# Patient Record
Sex: MALE | Race: WHITE | Employment: UNEMPLOYED | URBAN - NONMETROPOLITAN AREA
[De-identification: names, ages, dates, MRNs, and addresses within clinical notes are randomized per-mention and may not be internally consistent; named-entity substitution may affect disease eponyms.]

---

## 2020-08-19 ENCOUNTER — HOSPITAL ENCOUNTER (INPATIENT)
Age: 30
LOS: 5 days | Discharge: HOME | DRG: 751 | End: 2020-08-24
Attending: EMERGENCY MEDICINE | Admitting: PSYCHIATRY & NEUROLOGY
Payer: COMMERCIAL

## 2020-08-19 LAB
ACETAMINOPHEN LEVEL: <15 UG/ML
ALBUMIN SERPL-MCNC: 4.7 G/DL (ref 3.5–5.2)
ALP BLD-CCNC: 56 U/L (ref 40–130)
ALT SERPL-CCNC: 80 U/L (ref 5–41)
AMPHETAMINE SCREEN, URINE: NEGATIVE
ANION GAP SERPL CALCULATED.3IONS-SCNC: 13 MMOL/L (ref 7–19)
AST SERPL-CCNC: 40 U/L (ref 5–40)
BARBITURATE SCREEN URINE: NEGATIVE
BASOPHILS ABSOLUTE: 0 K/UL (ref 0–0.2)
BASOPHILS RELATIVE PERCENT: 0.3 % (ref 0–1)
BENZODIAZEPINE SCREEN, URINE: NEGATIVE
BILIRUB SERPL-MCNC: <0.2 MG/DL (ref 0.2–1.2)
BILIRUBIN URINE: NEGATIVE
BLOOD, URINE: NEGATIVE
BUN BLDV-MCNC: 16 MG/DL (ref 6–20)
CALCIUM SERPL-MCNC: 10 MG/DL (ref 8.6–10)
CANNABINOID SCREEN URINE: NEGATIVE
CHLORIDE BLD-SCNC: 100 MMOL/L (ref 98–111)
CLARITY: CLEAR
CO2: 27 MMOL/L (ref 22–29)
COCAINE METABOLITE SCREEN URINE: NEGATIVE
COLOR: YELLOW
CREAT SERPL-MCNC: 1 MG/DL (ref 0.5–1.2)
EOSINOPHILS ABSOLUTE: 0.1 K/UL (ref 0–0.6)
EOSINOPHILS RELATIVE PERCENT: 1.5 % (ref 0–5)
ETHANOL: <10 MG/DL (ref 0–0.08)
GFR AFRICAN AMERICAN: >59
GFR NON-AFRICAN AMERICAN: >60
GLUCOSE BLD-MCNC: 93 MG/DL (ref 74–109)
GLUCOSE URINE: NEGATIVE MG/DL
HCT VFR BLD CALC: 44.5 % (ref 42–52)
HEMOGLOBIN: 15.3 G/DL (ref 14–18)
IMMATURE GRANULOCYTES #: 0.1 K/UL
KETONES, URINE: NEGATIVE MG/DL
LEUKOCYTE ESTERASE, URINE: NEGATIVE
LYMPHOCYTES ABSOLUTE: 2.6 K/UL (ref 1.1–4.5)
LYMPHOCYTES RELATIVE PERCENT: 32.6 % (ref 20–40)
Lab: NORMAL
MCH RBC QN AUTO: 30.8 PG (ref 27–31)
MCHC RBC AUTO-ENTMCNC: 34.4 G/DL (ref 33–37)
MCV RBC AUTO: 89.7 FL (ref 80–94)
MONOCYTES ABSOLUTE: 0.6 K/UL (ref 0–0.9)
MONOCYTES RELATIVE PERCENT: 7.3 % (ref 0–10)
NEUTROPHILS ABSOLUTE: 4.6 K/UL (ref 1.5–7.5)
NEUTROPHILS RELATIVE PERCENT: 57.7 % (ref 50–65)
NITRITE, URINE: NEGATIVE
OPIATE SCREEN URINE: NEGATIVE
PDW BLD-RTO: 12.8 % (ref 11.5–14.5)
PH UA: 6 (ref 5–8)
PLATELET # BLD: 234 K/UL (ref 130–400)
PMV BLD AUTO: 10.2 FL (ref 9.4–12.4)
POTASSIUM REFLEX MAGNESIUM: 4.7 MMOL/L (ref 3.5–5)
PROTEIN UA: ABNORMAL MG/DL
RBC # BLD: 4.96 M/UL (ref 4.7–6.1)
SALICYLATE, SERUM: <3 MG/DL (ref 3–10)
SODIUM BLD-SCNC: 140 MMOL/L (ref 136–145)
SPECIFIC GRAVITY UA: 1.02 (ref 1–1.03)
TOTAL PROTEIN: 7.3 G/DL (ref 6.6–8.7)
UROBILINOGEN, URINE: 0.2 E.U./DL
WBC # BLD: 7.9 K/UL (ref 4.8–10.8)

## 2020-08-19 PROCEDURE — 99283 EMERGENCY DEPT VISIT LOW MDM: CPT

## 2020-08-19 PROCEDURE — 6370000000 HC RX 637 (ALT 250 FOR IP): Performed by: PSYCHIATRY & NEUROLOGY

## 2020-08-19 PROCEDURE — 80053 COMPREHEN METABOLIC PANEL: CPT

## 2020-08-19 PROCEDURE — 85025 COMPLETE CBC W/AUTO DIFF WBC: CPT

## 2020-08-19 PROCEDURE — 99284 EMERGENCY DEPT VISIT MOD MDM: CPT

## 2020-08-19 PROCEDURE — 80307 DRUG TEST PRSMV CHEM ANLYZR: CPT

## 2020-08-19 PROCEDURE — 81003 URINALYSIS AUTO W/O SCOPE: CPT

## 2020-08-19 PROCEDURE — G0480 DRUG TEST DEF 1-7 CLASSES: HCPCS

## 2020-08-19 PROCEDURE — 36415 COLL VENOUS BLD VENIPUNCTURE: CPT

## 2020-08-19 PROCEDURE — 1240000000 HC EMOTIONAL WELLNESS R&B

## 2020-08-19 RX ORDER — ARIPIPRAZOLE 10 MG/1
5 TABLET ORAL DAILY
COMMUNITY

## 2020-08-19 RX ORDER — ALBUTEROL SULFATE 90 UG/1
2 AEROSOL, METERED RESPIRATORY (INHALATION) EVERY 6 HOURS PRN
COMMUNITY

## 2020-08-19 RX ORDER — POLYETHYLENE GLYCOL 3350 17 G/17G
17 POWDER, FOR SOLUTION ORAL DAILY PRN
Status: DISCONTINUED | OUTPATIENT
Start: 2020-08-19 | End: 2020-08-24 | Stop reason: HOSPADM

## 2020-08-19 RX ORDER — ACETAMINOPHEN 325 MG/1
650 TABLET ORAL EVERY 4 HOURS PRN
Status: DISCONTINUED | OUTPATIENT
Start: 2020-08-19 | End: 2020-08-24 | Stop reason: HOSPADM

## 2020-08-19 RX ORDER — TRAZODONE HYDROCHLORIDE 50 MG/1
50 TABLET ORAL ONCE
Status: DISCONTINUED | OUTPATIENT
Start: 2020-08-19 | End: 2020-08-20

## 2020-08-19 RX ORDER — VENLAFAXINE 50 MG/1
50 TABLET ORAL 3 TIMES DAILY
COMMUNITY

## 2020-08-19 RX ORDER — TRAZODONE HYDROCHLORIDE 100 MG/1
100 TABLET ORAL NIGHTLY
COMMUNITY

## 2020-08-19 RX ORDER — HYDROXYZINE HYDROCHLORIDE 25 MG/1
25 TABLET, FILM COATED ORAL 3 TIMES DAILY PRN
Status: DISCONTINUED | OUTPATIENT
Start: 2020-08-19 | End: 2020-08-24 | Stop reason: HOSPADM

## 2020-08-19 RX ORDER — NICOTINE 21 MG/24HR
1 PATCH, TRANSDERMAL 24 HOURS TRANSDERMAL DAILY
Status: DISCONTINUED | OUTPATIENT
Start: 2020-08-19 | End: 2020-08-24 | Stop reason: HOSPADM

## 2020-08-19 RX ADMIN — HYDROXYZINE HYDROCHLORIDE 25 MG: 25 TABLET, FILM COATED ORAL at 17:49

## 2020-08-19 SDOH — HEALTH STABILITY: MENTAL HEALTH: HOW OFTEN DO YOU HAVE A DRINK CONTAINING ALCOHOL?: NEVER

## 2020-08-19 ASSESSMENT — SLEEP AND FATIGUE QUESTIONNAIRES
DO YOU USE A SLEEP AID: YES
RESTFUL SLEEP: YES
DIFFICULTY FALLING ASLEEP: NO
DIFFICULTY ARISING: NO
DO YOU HAVE DIFFICULTY SLEEPING: YES
AVERAGE NUMBER OF SLEEP HOURS: 8
SLEEP PATTERN: DISTURBED/INTERRUPTED SLEEP
DIFFICULTY STAYING ASLEEP: YES

## 2020-08-19 ASSESSMENT — LIFESTYLE VARIABLES: HISTORY_ALCOHOL_USE: NO

## 2020-08-19 ASSESSMENT — PATIENT HEALTH QUESTIONNAIRE - PHQ9: SUM OF ALL RESPONSES TO PHQ QUESTIONS 1-9: 9

## 2020-08-19 NOTE — PROGRESS NOTES
[facility] Admission From ED  Nursing Admission Note              There is no problem list on file for this patient. Pt admitted [facility] Dr. Verónica clayton [facility] [address] [facility] [address] [facility]11[facility]611-01. Arrived on unit via [facility] with security/nurse escort. Pt appropriately attired in paper scrubs. Body assessment completed by Celia with no contraband discovered. All tubes, lines, and drains were appropriately discontinued by ED staff prior to pt transfer to [facility]. Pt belongings and valuables inventoried and cataloged, stored per policy. Pt oriented to surroundings, program expectations, and copy pt rights given. Received admit packet: [address], Visitation Info, Fall Prevention, Restraints Info. Consents reviewed, signed Pt Rights, Handbook Acceptance, Visit/Call Acceptance, PHI Release, Social Info Release, and Treatment Agreement. Pt verbalizes understanding. Yes. Pt is a smoker? yes Pt offered Nicotine patch yes    Identifies stressors. Suicidal thought.          C/o:    Notes:

## 2020-08-19 NOTE — ED PROVIDER NOTES
[facility] 6 ADULT [facility]  eMERGENCY dEPARTMENT eNCOUnter      Pt Name: Gui Murry  MRN: [id number]  [place] 1990  Date of evaluation: 8/19/2020  Provider: Mayra Kohler MD    CHIEF COMPLAINT       Chief Complaint   Patient presents with   [address] Problem     presents with c/o si, states wants to step in front of train         HISTORY OF PRESENT ILLNESS   (Location/Symptom, Timing/Onset,Context/Setting, Quality, Duration, Modifying Factors, Severity)  Note limiting factors. Gui Murry is a 30 y.o. male who presents to the emergency department      The history is provided by the patient. Mental Health Problem   Presenting symptoms: depression and suicidal thoughts (\"walk in front of train\")    Patient accompanied by: no one. Degree of incapacity (severity):  Severe  Onset quality:  Unable to specify (d/c'd from psych facility in [facility] this am)  Timing:  Constant  Chronicity:  Recurrent  Context: recent medication change    Context: not alcohol use, not drug abuse and not noncompliant    Treatment compliance: All of the time  Relieved by:  Nothing  Worsened by:  Nothing  Ineffective treatments:  Antidepressants and mood stabilizers  Associated symptoms comment:  HOMELESS  Risk factors: hx of mental illness (bipolar) and hx of suicide attempts        NursingNotes were reviewed. REVIEW OF SYSTEMS    (2-9 systems for level 4, 10 or more for level 5)     Review of Systems   Psychiatric/Behavioral: Positive for suicidal ideas (\"walk in front of train\"). All other systems reviewed and are negative. Except as noted above the remainder of the review of systems was reviewed and negative. PAST MEDICAL HISTORY   History reviewed. No pertinent past medical history. SURGICALHISTORY     History reviewed. No pertinent surgical history.       CURRENT MEDICATIONS       Current Discharge Medication List      CONTINUE these medications which have NOT CHANGED    Details   ARIPiprazole (ABILIFY) 10 MG tablet Take 10 mg by mouth daily      traZODone (DESYREL) 100 MG tablet Take 100 mg by mouth nightly      venlafaxine (EFFEXOR) 50 MG tablet Take 50 mg by mouth 3 times daily      albuterol sulfate  (90 Base) MCG/ACT inhaler Inhale 2 puffs into the lungs every 6 hours as needed for Wheezing             ALLERGIES     Patient has no known allergies. FAMILY HISTORY     History reviewed. No pertinent family history.        SOCIAL HISTORY       Social History     Socioeconomic History    Marital status: Single     Spouse name: None    Number of children: None    Years of education: None    Highest education level: None   Occupational History    None   Social Needs    Financial resource strain: None    Food insecurity     Worry: None     Inability: None    Transportation needs     Medical: None     Non-medical: None   Tobacco Use    Smoking status: Current Every Day Smoker    Smokeless tobacco: Never Used   Substance and Sexual Activity    Alcohol use: Never     Frequency: Never    Drug use: Yes     Types: Marijuana    Sexual activity: None   Lifestyle    Physical activity     Days per week: None     Minutes per session: None    Stress: None   Relationships    Social connections     Talks on phone: None     Gets together: None     Attends Congregation service: None     Active member of club or organization: None     Attends meetings of clubs or organizations: None     Relationship status: None    Intimate partner violence     Fear of current or ex partner: None     Emotionally abused: None     Physically abused: None     Forced sexual activity: None   Other Topics Concern    None   Social History Narrative    None       SCREENINGS      @FLOW([id number])@      PHYSICAL EXAM    (up to 7 for level 4, 8 or more for level 5)     ED Triage Vitals [08/19/20 1414]   BP Temp Temp src Pulse Resp SpO2 Height Weight   (!) 146/84 98.2 °F (36.8 °C) -- 84 18 97 % 5' 5\" (1.651 m) 285 lb (129.3 kg) Physical Exam  Vitals signs and nursing note reviewed. Constitutional:       General: He is not in acute distress. Appearance: Normal appearance. He is obese. He is not ill-appearing, toxic-appearing or diaphoretic. HENT:      Mouth/Throat:      Mouth: Mucous membranes are moist.      Pharynx: Oropharynx is clear. Eyes:      Conjunctiva/sclera: Conjunctivae normal.   Neck:      Musculoskeletal: Normal range of motion and neck supple. Cardiovascular:      Rate and Rhythm: Normal rate and regular rhythm. Heart sounds: Normal heart sounds. Pulmonary:      Effort: Pulmonary effort is normal.      Breath sounds: Normal breath sounds. Musculoskeletal:      Right lower leg: No edema. Left lower leg: No edema. Skin:     General: Skin is warm and dry. Neurological:      General: No focal deficit present. Mental Status: He is alert and oriented to person, place, and time. Cranial Nerves: No cranial nerve deficit.    Psychiatric:         Mood and Affect: Mood normal.         DIAGNOSTIC RESULTS     EKG: All EKG's are interpreted by the Emergency Department Physician who either signs or Co-signsthis chart in the absence of a cardiologist.        RADIOLOGY:   Mallie Rocher such as CT, Ultrasound and MRI are read by the radiologist. Plain radiographic images are visualized and preliminarily interpreted by the emergency physician with the below findings:        Interpretation per the Radiologist below, if available at the time ofthis note:    No orders to display         ED BEDSIDE ULTRASOUND:   Performed by ED Physician - none    LABS:  Labs Reviewed   COMPREHENSIVE METABOLIC PANEL W/ REFLEX TO MG FOR LOW K - Abnormal; Notable for the following components:       Result Value    ALT 80 (*)     All other components within normal limits   URINE RT REFLEX TO CULTURE - Abnormal; Notable for the following components:    Protein, UA TRACE (*)     All other components within normal limits   CBC WITH AUTO DIFFERENTIAL   ACETAMINOPHEN LEVEL   SALICYLATE LEVEL   ETHANOL   URINE DRUG SCREEN       All other labs were within normal range or not returned as of this dictation. EMERGENCY DEPARTMENT COURSE and DIFFERENTIAL DIAGNOSIS/MDM:   Vitals:    Vitals:    08/19/20 1414 08/19/20 1500 08/19/20 1600 08/19/20 1630   BP: (!) 146/84 (!) 142/84 (!) 144/86 (!) 119/94   Pulse: 84 82 84 100   Resp: 18 17 16 18   Temp: 98.2 °F (36.8 °C)  98.2 °F (36.8 °C) 97.1 °F (36.2 °C)   TempSrc:    Temporal   SpO2: 97% 98% 97% 98%   Weight: 285 lb (129.3 kg)      Height: 5' 5\" (1.651 m)              MDM  Number of Diagnoses or Management Options  Diagnosis management comments: Discussed with Dr. Tara Ko - admit      CRITICAL CARE TIME   Total Critical Care time was 0 minutes, excluding separately reportable procedures. There was a high probability of clinically significant/lifethreatening deterioration in the patient's condition which required my urgent intervention. CONSULTS:  IP CONSULT TO FAMILY MEDICINE    PROCEDURES:  Unless otherwise noted below, none     Procedures    FINAL IMPRESSION      1. Suicidal ideation          DISPOSITION/PLAN   DISPOSITION        PATIENT REFERRED TO:  No follow-up provider specified.     DISCHARGE MEDICATIONS:  Current Discharge Medication List             (Please note that portions of this note were completed with a voice recognition program.  Efforts were made to edit the dictations but occasionally words are mis-transcribed.)    Justin Fan MD (electronically signed)  Attending Emergency Physician          Justin Fan MD  08/19/20 1275

## 2020-08-19 NOTE — ED NOTES
Bed: 16  Expected date:   Expected time:   Means of arrival:   Comments:  [address], [facility]  08/19/20 8944

## 2020-08-20 PROBLEM — F33.2 MAJOR DEPRESSIVE DISORDER, RECURRENT SEVERE WITHOUT PSYCHOTIC FEATURES (HCC): Status: ACTIVE | Noted: 2020-08-20

## 2020-08-20 PROBLEM — J45.909 ASTHMA: Status: ACTIVE | Noted: 2020-08-20

## 2020-08-20 PROBLEM — F32.A DEPRESSION: Status: ACTIVE | Noted: 2020-08-20

## 2020-08-20 PROBLEM — F17.200 TOBACCO USE DISORDER: Status: ACTIVE | Noted: 2020-08-20

## 2020-08-20 PROBLEM — F32.2 SEVERE MAJOR DEPRESSION WITHOUT PSYCHOTIC FEATURES (HCC): Status: ACTIVE | Noted: 2020-08-20

## 2020-08-20 PROBLEM — Z87.898 HISTORY OF ALCOHOL USE DISORDER: Status: ACTIVE | Noted: 2020-08-20

## 2020-08-20 PROCEDURE — 6370000000 HC RX 637 (ALT 250 FOR IP): Performed by: NURSE PRACTITIONER

## 2020-08-20 PROCEDURE — 90792 PSYCH DIAG EVAL W/MED SRVCS: CPT | Performed by: NURSE PRACTITIONER

## 2020-08-20 PROCEDURE — 6370000000 HC RX 637 (ALT 250 FOR IP): Performed by: PSYCHIATRY & NEUROLOGY

## 2020-08-20 PROCEDURE — 1240000000 HC EMOTIONAL WELLNESS R&B

## 2020-08-20 RX ORDER — GABAPENTIN 300 MG/1
300 CAPSULE ORAL 3 TIMES DAILY
Status: DISCONTINUED | OUTPATIENT
Start: 2020-08-20 | End: 2020-08-21

## 2020-08-20 RX ORDER — ARIPIPRAZOLE 10 MG/1
10 TABLET ORAL DAILY
Status: DISCONTINUED | OUTPATIENT
Start: 2020-08-20 | End: 2020-08-20

## 2020-08-20 RX ORDER — VENLAFAXINE HYDROCHLORIDE 37.5 MG/1
37.5 CAPSULE, EXTENDED RELEASE ORAL
Status: DISCONTINUED | OUTPATIENT
Start: 2020-08-21 | End: 2020-08-21

## 2020-08-20 RX ORDER — TRAZODONE HYDROCHLORIDE 100 MG/1
100 TABLET ORAL NIGHTLY
Status: DISCONTINUED | OUTPATIENT
Start: 2020-08-20 | End: 2020-08-20

## 2020-08-20 RX ORDER — LAMOTRIGINE 25 MG/1
25 TABLET ORAL 2 TIMES DAILY
COMMUNITY

## 2020-08-20 RX ORDER — ARIPIPRAZOLE 5 MG/1
5 TABLET ORAL DAILY
Status: ON HOLD | COMMUNITY
End: 2020-08-24 | Stop reason: HOSPADM

## 2020-08-20 RX ORDER — TRAZODONE HYDROCHLORIDE 100 MG/1
100 TABLET ORAL NIGHTLY PRN
Status: DISCONTINUED | OUTPATIENT
Start: 2020-08-20 | End: 2020-08-24 | Stop reason: HOSPADM

## 2020-08-20 RX ORDER — ARIPIPRAZOLE 5 MG/1
5 TABLET ORAL DAILY
Status: DISCONTINUED | OUTPATIENT
Start: 2020-08-21 | End: 2020-08-24 | Stop reason: HOSPADM

## 2020-08-20 RX ORDER — ALBUTEROL SULFATE 2.5 MG/3ML
2.5 SOLUTION RESPIRATORY (INHALATION) EVERY 6 HOURS PRN
Status: DISCONTINUED | OUTPATIENT
Start: 2020-08-20 | End: 2020-08-24 | Stop reason: HOSPADM

## 2020-08-20 RX ORDER — CLONAZEPAM 0.5 MG/1
0.5 TABLET ORAL DAILY PRN
Status: ON HOLD | COMMUNITY
End: 2020-08-24 | Stop reason: HOSPADM

## 2020-08-20 RX ORDER — ALBUTEROL SULFATE 90 UG/1
2 AEROSOL, METERED RESPIRATORY (INHALATION) EVERY 6 HOURS PRN
Status: DISCONTINUED | OUTPATIENT
Start: 2020-08-20 | End: 2020-08-20 | Stop reason: CLARIF

## 2020-08-20 RX ADMIN — GABAPENTIN 300 MG: 300 CAPSULE ORAL at 20:41

## 2020-08-20 RX ADMIN — GABAPENTIN 300 MG: 300 CAPSULE ORAL at 16:51

## 2020-08-20 RX ADMIN — TRAZODONE HYDROCHLORIDE 100 MG: 100 TABLET ORAL at 20:41

## 2020-08-20 RX ADMIN — HYDROXYZINE HYDROCHLORIDE 25 MG: 25 TABLET, FILM COATED ORAL at 10:21

## 2020-08-20 NOTE — PLAN OF CARE
Group Therapy Note    Date: 8/20/2020  Start Time: 1430  End Time:  4369  Number of Participants: 7    Type of Group: Cognitive Skills    Wellness Binder Information  Module Name:  Women's Issues  Session Number:  1    Group Goal for Pt: To raise awareness of the effectiveness of assertive communication    Notes:  Pt did not attend group discussion. Pt was invited/encouraged. Status After Intervention:      Participation Level:     Participation Quality:       Speech:         Thought Process/Content:       Affective Functioning:       Mood:       Level of consciousness:        Response to Learning:       Endings:     Modes of Intervention:       Discipline Responsible:       Signature:  Clifton Cordoba

## 2020-08-20 NOTE — PROGRESS NOTES
Psychosocial and CSSR-S completed on this date. Pt long and short term goals discussed. Pt voiced understanding. Treatment plan sheet signed. Pt verbalized understanding of the treatment plan. Pt participated in goals and objectives of the treatment plan. Pt completed safety plan with , pt received copy of plan, and original was placed into pt's chart. It was identified that pt will require outpatient follow up appointments at local [facility] behavioral health facility such as; [facility] [address] [place] [address]. Pt validated need for appointments. Pt was also provided a handout of contact information for drug and alcohol treatment centers and other community support service such as [facility] and [facility]. Pt somewhat interested in going to a sober living house, [facility] In social work office aware and is looking into services, pt also was given numbers to locations. In the last 6 months has the pt been danger to self: YES  In the last 6 months has the pt been danger to others:  /NO  Legal Guardian/POA: NO      Provided pt with [employer] handout entitled \"Quitting Smoking,\" reviewed handout with pt addressing dangers of smoking, developing coping skills, and providing basic information about quitting.  Patient received all components / Patient refused/declined practical counseling of tobacco practical counseling during the hospital stay

## 2020-08-20 NOTE — PLAN OF CARE
Problem: Discharge Planning:  Goal: Discharged to appropriate level of care  Description: Discharged to appropriate level of care  Outcome: Ongoing     Problem: Health Maintenance - Impaired:  Goal: Ability to perform activities of daily living will improve  Description: Ability to perform activities of daily living will improve  Outcome: Ongoing  Goal: Able to sleep without medication for appropriate length of time  Description: Able to sleep without medication for appropriate length of time  Outcome: Ongoing  Goal: Maintenance of adequate nutrition will improve  Description: Maintenance of adequate nutrition will improve  Outcome: Ongoing     Problem: Mood - Altered:  Goal: Mood stable  Description: Mood stable  Outcome: Ongoing     Problem: Self-Esteem - Low:  Goal: Demonstrates positive self-esteem  Description: Demonstrates positive self-esteem  Outcome: Ongoing     Problem: Cerebrospinal Fluid Leakage - Risk Of:  Goal: Demonstration of organized thought processes  Description: Demonstration of organized thought processes  Outcome: Completed     Problem: Violence - Risk of, Self/Other-Directed:  Goal: Knowledge of developmental care interventions  Description: Absence of violence  Outcome: Ongoing     Problem: Suicide risk  Goal: Provide patient with safe environment  Description: Provide patient with safe environment  Outcome: Ongoing     Problem: Depressive Behavior With or Without Suicide Precautions:  Goal: Able to verbalize acceptance of life and situations over which he or she has no control  Description: Able to verbalize acceptance of life and situations over which he or she has no control  8/20/2020 1328 by Bibiana Mario RN  Outcome: Ongoing  8/20/2020 1213 by Faraz Bennett  Outcome: Ongoing  Note:                                                                     Group Therapy Note    Date: 8/20/2020  Start Time: 1000  End Time:  1100  Number of Participants: 6    Type of Group: Psychoeducation    Wellness Binder Information  Module Name:  Women's Issues  Session Number:  4    Group Goal for Pt: To raise awareness of how thoughts influence feelings    Notes:  Pt demonstrated improved awareness of how thoughts influence feelings by actively participating in group discussion.     Status After Intervention:  Unchanged    Participation Level: Interactive    Participation Quality: Attentive      Speech:  normal      Thought Process/Content: Logical      Affective Functioning: Congruent      Mood: anxious and depressed      Level of consciousness:  Alert and Oriented x4      Response to Learning: Able to verbalize current knowledge/experience, Able to verbalize/acknowledge new learning, and Progressing to goal      Endings: None Reported    Modes of Intervention: Education      Discipline Responsible: Psychoeducational Specialist      Signature:  Chelsi Monzon

## 2020-08-20 NOTE — PROGRESS NOTES
[facility] Adult Unit Daily Assessment  Nursing Progress Note    Room: [facility]611-01   Name: Geoffrey Jansen   Age: 30 y.o. Gender: male   Dx: <principal problem not specified>  Precautions: suicide risk  Inpatient Status: voluntary       SLEEP:    Sleep Quality Good  Sleep Medications: No   PRN Sleep Meds: No       MEDICAL:    Other PRN Meds: No   Med Compliant: Yes  Accu-Chek: No  Oxygen/CPAP/BiPAP: No  CIWA/CINA: No   PAIN Assessment: none  Side Effects from medication: No      PSYCH:    Depression: did not assess, just arrived to the floor   Anxiety: did not assess, just arrived to the floor  SI denies at this current moment   HI denies   AVH:denies      GENERAL:    Appetite: decreased    Social: No   Speech: normal   Appearance: appropriately dressed and healthy looking      Notes: Patient calm and cooperative with staff and peers. Patient came to the desk and asked this nurse for something to help him sleep. Order obtained and when returning to his room, patient was already asleep. Will continue to monitor.          Electronically signed by Urvashi Wylie RN on 8/20/20 at 12:30 AM CDT

## 2020-08-20 NOTE — PROGRESS NOTES
Group Therapy Note    Start Time: 739  End Time:  684  Number of Participants: 8    Type of Group: Community Meeting       Patient's Goal:  \"to concentrate on me\"      Notes:      Participation Level:  Active Listener       Participation Quality: Appropriate      Thought Process/Content: Logical      Affective Functioning: Congruent      Mood: calm      Level of consciousness:  Alert      Modes of Intervention: Support      Discipline Responsible: Behavioral Health Tech II      Signature:  Flip Arreola

## 2020-08-20 NOTE — PROGRESS NOTES
Treatment Team Note:    SW met with [address] team to discuss [address] plans.      Progress/Behavior/Group Attendance: TBD    Target Symptoms/Reason for admission:  Patient admitted to [facility] due to Presents with c/o si  Diagnoses: Severe major depression without psychotic features ([facility] [address]  Suicidal ideation, Tobacco use disorder, History of alcohol use disorder   UDS: Neg  BAL:  Neg      AftercarePlan: [address] lives with: Homeless    Collateral obtained from: mom  On:8/2020    Family Session: LES    Misc:

## 2020-08-20 NOTE — H&P
[address] [place] [address]    Psychiatric Initial Evaluation    Date of Evaluation:  8/20/2020  Session Type:  63057 Psychiatric Diagnostic Interview Exam   Name:  Ofelia Ervin  Age:  30 y.o. Sex:  male  Ethnicity:   Primary Care Physician:  No primary care provider on file. Patient Care Team:  No care team member to display  Chief Complaint: \" I am not wanting to live anymore. \"    History of Present Illness    Historian: patient  Complaint Type: anxiety, depression, loss of interest in favorite activities, sleep disturbance and tobacco use  Course of Symptoms: ongoing  Symptoms Onset: gradual  Onset Approximately: gradual  Precipitating Factors: history of mental illness  Severity: moderate  Risk Factors: history of depression         Patient is a 60-year-old  male who presents with depression and suicidal ideation with a plan to step out in front of a train. He recently left an inpatient psychiatric hospital in [facility] yesterday. He said at that facility for 1 week. He reports that he was trying to get to the homeless shelter ([address]) here in [facility]. Reports when he arrived they told him his urine drug screen was positive for marijuana and he was not able to stay there. He states, \"after they told me that I thought I am done with life I have been in the bottom for so long. \"  He reports he then started having suicidal thoughts to step in front of a train. Urine drug screen negative, blood alcohol level negative. Endorses a history of alcohol use disorder. Reports he has not drank heavily in a year. Medical history includes asthma. Today he continues to endorse suicidal ideation however does contract for safety. He denies homicidal ideation and psychosis. Reports he would like to move back in with his mother however he is not sure if that is an option.   Reports sleep as \"very poor\", appetite has been fine, energy and concentration have been poor. He rates depression as a 7 on a scale of 0-10 and anxiety as an 8. Becomes tearful during the evaluation and states, \"I just want to be stable again. \"  Reports a prior suicide attempt 5 years ago by hanging. His mother was contacted and she reports he is not allowed to live with her. She reports he has been bouncing around to different friends houses, psychiatric facilities and shelters. She also reports he has a history of abusing prescription medication. He was educated on sober living homes and surrounding shelters. Allergies:    Allergies as of 08/19/2020    (No Known Allergies)       Vital Signs:  Last set of tests and vitals:  Vitals:    08/20/20 0806   BP: (!) 131/93   Pulse: 104   Resp: 18   Temp: 96.5 °F (35.8 °C)   SpO2:      Labs Reviewed   COMPREHENSIVE METABOLIC PANEL W/ REFLEX TO MG FOR LOW K - Abnormal; Notable for the following components:       Result Value    ALT 80 (*)     All other components within normal limits   URINE RT REFLEX TO CULTURE - Abnormal; Notable for the following components:    Protein, UA TRACE (*)     All other components within normal limits   CBC WITH AUTO DIFFERENTIAL   ACETAMINOPHEN LEVEL   SALICYLATE LEVEL   ETHANOL   URINE DRUG SCREEN       Current Medications:   Current Facility-Administered Medications   Medication Dose Route Frequency Provider Last Rate Last Dose    acetaminophen (TYLENOL) tablet 650 mg  650 mg Oral Q4H PRN Brea Valdovinos MD        polyethylene glycol (GLYCOLAX) packet 17 g  17 g Oral Daily PRN Brea Valdovinos MD        nicotine (NICODERM CQ) 21 MG/24HR 1 patch  1 patch Transdermal Daily Brea Valdovinos MD   1 patch at 08/20/20 0915    hydrOXYzine (ATARAX) tablet 25 mg  25 mg Oral TID PRN Brea Valdovinos MD   25 mg at 08/20/20 1021    traZODone (DESYREL) tablet 50 mg  50 mg Oral Once Brea Valdovinos MD           Previous Psychiatric/Substance Use History  Social History:   Born/Raised: [place]  Marital Status:Single  Children:No  Educational Level:High School  Trauma History:emotional/verbal FROM MOTHER  Legal History:2 DUIS AGE 17 AND 23  Tobacco use: 1.0 PPD X 10 YEARS  Employment: UNEMPLOYED SINCE MARCH   Experience: DENIES  Hinduism preference: [employer]   Support system: \"MY MOM AND SISTER\"  Access to guns: DENIES  Payee/POA/ GUARDIAN: DENIES      Medical History:  History reviewed. No pertinent past medical history. MURRAY History:   Marijuana  Past alcohol usage. Quit 6 years ago. Previous CD treatment: DENIES    Lifetime Psychiatric Review of Systems          Leah or Hypomania:  no     Panic Attacks:  no     Phobias:  no     Obsessions and Compulsions:  no     Body or Vocal Tics:  no     Hallucinations:  no     Delusions:  no    Previous psychiatric diagnosis- MDD    Previous psychiatric medications- Abilify, Effexor, Lexapro, Wellbutrin, Buspar, Hydroxyzine, Adderall and Focalin, Klonopin, Valium, Ativan    Previous suicide attempts- 5 years ago by hanging    Previous self injurious behavior- denies    Previous outpatient psychiatric services- [facility]    Previous inpatient psychiatric hospitalizations- [place], [place], [address] [place] [address]    Family History: Mother:  depression and anxiety with panic attacks  Father: depression alcohol abuse  Sister: depression  Paternal Grandmother:completed suicide         MENTAL STATUS EXAM:   Level of consciousness:    Appearance:  well-appearing, street clothes, in chair, good grooming and good hygiene  Behavior/Motor:  no abnormalities noted  Attitude toward examiner: within normal limits and awake  Speech:  normal rate and normal volume  Mood:  \" I have been anxious and hopeless. \"  Affect:  flat  Thought processes:  linear and goal directed  Thought content:  Homocidal ideation : denies  Suicidal Ideation:  active  Delusions:  no evidence of delusions  Perceptual Disturbance:  denies any perceptual disturbance  Cognition:  oriented to person, place, and time  Concentration : good  Memory intact for recent and remote  Mini Mental Status deficits in 3 items recall (1/3) and copying design-28/30  Fund of knowledge:  adequate  Abstract thinking: adequate   Insight:  good  Judgment:  good        Review of Systems:  History obtained from the patient  General ROS: positive for  - sleep disturbance  Psychological ROS: positive for - anxiety, depression, sleep disturbances and suicidal ideation  Ophthalmic ROS: negative  ENT ROS: negative  Allergy and Immunology ROS: negative  Hematological and Lymphatic ROS: negative  Endocrine ROS: negative  Breast ROS: negative  Respiratory ROS: positive for - wheezing  Cardiovascular ROS: no chest pain or dyspnea on exertion  Gastrointestinal ROS: no abdominal pain, change in bowel habits, or black or bloody stools  Genito-Urinary ROS: no dysuria, trouble voiding, or hematuria  Musculoskeletal ROS: negative  Neurological ROS: CN II- XII grossly intact   Dermatological ROS: negative      DSM V Diagnoses:    Severe major depression without psychotic features (HCC)  Suicidal ideation  Tobacco use disorder  History of alcohol use disorder         ELOS 3-5 days          Recommendations:  1. Admit to [facility] and monitor on 15 min checks  2. Phoenix Garcia to be reviewed. 3. Collateral information from family with release  4. Medical monitoring by Dr Lino Jean and associates  5. Acclimate to the unit and encourage group attendance   6. Legal Status: Voluntary  7. Precautions: Suicide  8. Diet: Regular  9. Will resume medications that he was recently discharged with; Abilify 5 mg po daily, Effexor 37.5 mg op daily, Trazodone 100 mg po nightly  10. Disposition: social work consulted    6. Nicotine patch 21 mg transdermal daily- smoking cessation medication  12. HGBA1C  13. LIPID PANEL  14. Will initiate Gabapentin 300 mg po TID- benzodiazepine withdrawal    15.  Social work to assist with a shelter      MIRTHA Mayberry

## 2020-08-20 NOTE — GROUP NOTE
Group Therapy Note    Date: 8/20/2020    Group Start Time: 1600  Group End Time: 1700  Group Topic: Healthy Living/Wellness    L [facility] ADULT [facility]    Laila Arrington RN; Bennie Manrique RN                       Patient's Goal: medication information       Status After Intervention:  Improved    Participation Level: Active Listener    Participation Quality: Appropriate      Speech:  normal      Thought Process/Content: Logical  Linear      Affective Functioning: Congruent      Level of consciousness:  Alert and Oriented x4      Response to Learning: Able to verbalize current knowledge/experience      Endings: None Reported    Modes of Intervention: Education      Discipline Responsible: Registered Nurse      Signature:   Laila Arrington RN

## 2020-08-20 NOTE — PROGRESS NOTES
BHI Daily Shift Assessment  Nursing Progress Note    Room: [facility]-01 Name: Eben Martínez Age: 30 y.o. 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 (HCC)  Precautions: suicide risk  CPAP: No Accu-Chek: No  MSE:  Status and Exam  Normal: No  Facial Expression: Worried, Sad, Avoids Gaze  Affect: Blunt, Constricted  Level of Consciousness: Alert  Mood:Normal: No  Mood: Depressed, Anxious, Helpless, Sad  Motor Activity:Normal: No  Motor Activity: Decreased  Interview Behavior: Cooperative  Preception: [place] to Person, Lucyann Emerald to Time, [place] to Place, [place] to Situation  Attention:Normal: No  Attention: Distractible  Thought Processes: Circumstantial  Thought Content:Normal: No  Thought Content: Poverty of Content  Hallucinations: None  Delusions: No  Memory:Normal: Yes  Insight and Judgment: Yes  Insight and Judgment: Poor Judgment, Poor Insight  Present Suicidal Ideation: Yes(contracts for safety)  Present Homicidal Ideation: No  Sleep: Yes, Fair, has restless sleep Hours Slept: 7 Sched Sleep Meds: Yes PRN Sleep Meds: Yes Other PRN Meds: Yes Med Compliant: Yes Appetite: good Percent Meals: 100% Social: Yes ADLs: No Speech: normal Depression: 8 Anxiety: 9    Babatunde Carty RN

## 2020-08-20 NOTE — PROGRESS NOTES
Comprehensive Nutrition Assessment    Type and Reason for Visit:  Initial, Positive Nutrition Screen    Nutrition Recommendations/Plan: continue current POC    Nutrition Assessment:  well nourished, morbidly obese appearing gentleman. Pt is not at risk for nutritional compromise or malnutrition AEB fat and muscle mass and po intake %  Pt states his UBW is about 280 lbs and does not have any dietary problems at this time    Malnutrition Assessment:  Malnutrition Status:  No malnutrition    Context:  Acute Illness     Findings of the 6 clinical characteristics of malnutrition:  Energy Intake:  No significant decrease in energy intake  Weight Loss:  No significant weight loss     Body Fat Loss:  No significant body fat loss     Muscle Mass Loss:  No significant muscle mass loss    Fluid Accumulation:  No significant fluid accumulation     Strength:  Not Performed      Nutrition Related Findings:  well nourished, morbidly obese      Wounds:  None       Current Nutrition Therapies:    DIET GENERAL; Anthropometric Measures:  · Height: 5' 5\" (165.1 cm)  · Current Body Weight: 280 lb (127 kg)   · Admission Body Weight: 285 lb (129.3 kg)(stated)    · Usual Body Weight: 280 lb (127 kg)     · Ideal Body Weight: 136 lbs; BMI: 46.6  · Adjusted Body Weight:  ; No Adjustment   · BMI Categories: Obese Class 3 (BMI 40.0 or greater)       Nutrition Diagnosis:   · Overweight/Obese related to excessive energy intake(medication) as evidenced by BMI      Nutrition Interventions:   Food and/or Nutrient Delivery:  Continue Current Diet  Nutrition Education/Counseling:  No recommendation at this time   Coordination of Nutrition Care:  Continued Inpatient Monitoring    Goals:  po intake 75% or reater.   Weight stable       Nutrition Monitoring and Evaluation:   Behavioral-Environmental Outcomes:      Food/Nutrient Intake Outcomes:  Food and Nutrient Intake  Physical Signs/Symptoms Outcomes:  Biochemical Data, Skin, Weight Discharge Planning:    Continue current diet     Electronically signed by Lanice Gowers, MS, RD, LD on 8/20/20 at 10:55 AM CDT    Contact: [phone]

## 2020-08-21 LAB
TSH REFLEX FT4: 3.55 UIU/ML (ref 0.35–5.5)
VITAMIN B-12: 479 PG/ML (ref 211–946)
VITAMIN D 25-HYDROXY: 14 NG/ML

## 2020-08-21 PROCEDURE — 6360000002 HC RX W HCPCS: Performed by: NURSE PRACTITIONER

## 2020-08-21 PROCEDURE — 1240000000 HC EMOTIONAL WELLNESS R&B

## 2020-08-21 PROCEDURE — 94640 AIRWAY INHALATION TREATMENT: CPT

## 2020-08-21 PROCEDURE — 84443 ASSAY THYROID STIM HORMONE: CPT

## 2020-08-21 PROCEDURE — 6370000000 HC RX 637 (ALT 250 FOR IP): Performed by: NURSE PRACTITIONER

## 2020-08-21 PROCEDURE — 6370000000 HC RX 637 (ALT 250 FOR IP): Performed by: PSYCHIATRY & NEUROLOGY

## 2020-08-21 PROCEDURE — 36415 COLL VENOUS BLD VENIPUNCTURE: CPT

## 2020-08-21 PROCEDURE — 99231 SBSQ HOSP IP/OBS SF/LOW 25: CPT | Performed by: NURSE PRACTITIONER

## 2020-08-21 PROCEDURE — 6370000000 HC RX 637 (ALT 250 FOR IP): Performed by: FAMILY MEDICINE

## 2020-08-21 PROCEDURE — 82607 VITAMIN B-12: CPT

## 2020-08-21 PROCEDURE — 82306 VITAMIN D 25 HYDROXY: CPT

## 2020-08-21 RX ORDER — ERGOCALCIFEROL 1.25 MG/1
50000 CAPSULE ORAL WEEKLY
Status: DISCONTINUED | OUTPATIENT
Start: 2020-08-21 | End: 2020-08-24 | Stop reason: HOSPADM

## 2020-08-21 RX ORDER — GABAPENTIN 100 MG/1
200 CAPSULE ORAL 3 TIMES DAILY
Status: DISCONTINUED | OUTPATIENT
Start: 2020-08-21 | End: 2020-08-24 | Stop reason: HOSPADM

## 2020-08-21 RX ORDER — VENLAFAXINE HYDROCHLORIDE 75 MG/1
75 CAPSULE, EXTENDED RELEASE ORAL
Status: DISCONTINUED | OUTPATIENT
Start: 2020-08-21 | End: 2020-08-23

## 2020-08-21 RX ADMIN — TRAZODONE HYDROCHLORIDE 100 MG: 100 TABLET ORAL at 20:16

## 2020-08-21 RX ADMIN — HYDROXYZINE HYDROCHLORIDE 25 MG: 25 TABLET, FILM COATED ORAL at 20:16

## 2020-08-21 RX ADMIN — GABAPENTIN 200 MG: 100 CAPSULE ORAL at 20:16

## 2020-08-21 RX ADMIN — ACETAMINOPHEN 650 MG: 325 TABLET, FILM COATED ORAL at 15:22

## 2020-08-21 RX ADMIN — ALBUTEROL SULFATE 2.5 MG: 2.5 SOLUTION RESPIRATORY (INHALATION) at 20:51

## 2020-08-21 RX ADMIN — GABAPENTIN 200 MG: 100 CAPSULE ORAL at 13:23

## 2020-08-21 RX ADMIN — ERGOCALCIFEROL 50000 UNITS: 1.25 CAPSULE ORAL at 13:23

## 2020-08-21 RX ADMIN — HYDROXYZINE HYDROCHLORIDE 25 MG: 25 TABLET, FILM COATED ORAL at 10:15

## 2020-08-21 RX ADMIN — ARIPIPRAZOLE 5 MG: 5 TABLET ORAL at 09:41

## 2020-08-21 RX ADMIN — VENLAFAXINE HYDROCHLORIDE 75 MG: 75 CAPSULE, EXTENDED RELEASE ORAL at 09:41

## 2020-08-21 ASSESSMENT — PAIN DESCRIPTION - LOCATION: LOCATION: HEAD

## 2020-08-21 ASSESSMENT — PAIN DESCRIPTION - ONSET: ONSET: ON-GOING

## 2020-08-21 ASSESSMENT — PAIN DESCRIPTION - ORIENTATION: ORIENTATION: MID

## 2020-08-21 ASSESSMENT — PAIN DESCRIPTION - PROGRESSION: CLINICAL_PROGRESSION: NOT CHANGED

## 2020-08-21 ASSESSMENT — PAIN DESCRIPTION - DESCRIPTORS: DESCRIPTORS: ACHING

## 2020-08-21 ASSESSMENT — PAIN DESCRIPTION - FREQUENCY: FREQUENCY: INTERMITTENT

## 2020-08-21 ASSESSMENT — PAIN DESCRIPTION - PAIN TYPE: TYPE: ACUTE PAIN

## 2020-08-21 ASSESSMENT — PAIN - FUNCTIONAL ASSESSMENT: PAIN_FUNCTIONAL_ASSESSMENT: ACTIVITIES ARE NOT PREVENTED

## 2020-08-21 ASSESSMENT — PAIN SCALES - GENERAL: PAINLEVEL_OUTOF10: 5

## 2020-08-21 NOTE — PLAN OF CARE
Problem: Health Maintenance - Impaired:  Goal: Ability to perform activities of daily living will improve  Outcome: Ongoing      Group Therapy Note     Date: 8/21/2020  Start Time: 1430  End Time:  6673  Number of Participants: 10     Type of Group: Cognitive Skills     Wellness Binder Information  Module Name:  emotional wellness  Session Number:  1     Patient's Goal:  validation of feelings     Notes:  pt acknowledged to have feelings validated it may be necessary to share feelings with others.      Status After Intervention:  Improved     Participation Level: Interactive     Participation Quality: Appropriate, Attentive, and Sharing        Speech:  normal        Thought Process/Content: Logical        Affective Functioning: Congruent        Mood: congruent        Level of consciousness:  Alert, Oriented x4, and Attentive        Response to Learning: Able to verbalize current knowledge/experience        Endings: None Reported     Modes of Intervention: Education        Discipline Responsible: Psychoeducational Specialist        Signature:  Светлана Matthew

## 2020-08-21 NOTE — BH NOTE
Group Therapy Note    Date: 8/21/2020  Start Time: 1330  End Time:  1400  Number of Participants: 10    Type of Group: Spirituality     Wellness Binder Information  Module Name:  Contemplative Prayer  Session Number:      Patient's Goal:  Nurture a sense of the Sacred    Notes:      Status After Intervention:  Improved    Participation Level:  Active Listener and Interactive    Participation Quality: Appropriate and Attentive      Speech:  normal      Thought Process/Content:       Affective Functioning: Congruent      Mood: euthymic, calm      Level of consciousness:  Oriented x4 and Attentive      Response to Learning: Able to verbalize current knowledge/experience and Capable of insight      Endings:     Modes of Intervention: Education and Activity      Discipline Responsible:       Signature:  Kt Manzo MA [facility]

## 2020-08-21 NOTE — PROGRESS NOTES
BHI Daily Shift Assessment  Nursing Progress Note    Room: [address] [place] [address]- Name: Solomon Bhagat Age: 30 y.o. Ethnicity:  Gender: male   Dx: Severe major depression without psychotic features ([address]  Precautions: suicide risk  CPAP: No Accu-Chek: No  MSE:  Status and Exam  Normal: No  Facial Expression: Flat  Affect: Appropriate  Level of Consciousness: Alert  Mood:Normal: No  Mood: Anxious, Depressed  Motor Activity:Normal: No  Motor Activity: Decreased  Interview Behavior: Cooperative  Preception: [place] to Person, [place] Bald to Time, [place] to Place, [place] to Situation  Attention:Normal: No  Attention: Unable to Concentrate  Thought Processes: Circumstantial  Thought Content:Normal: No  Thought Content: Poverty of Content  Hallucinations: None  Delusions: No  Memory:Normal: No  Memory: Confabulation  Insight and Judgment: No  Insight and Judgment: Poor Insight  Present Suicidal Ideation: (S) Yes(Would jump in front of a train, contracted for safety)  Present Homicidal Ideation: No  Sleep: Yes, Good, no sleep issues Hours Slept: 10 Sched Sleep Meds: No PRN Sleep Meds: Yes Other PRN Meds: No Med Compliant: Yes Appetite: good Percent Meals: 75% Social: No ADLs: No Speech: slurred Depression: 5 Anxiety: 8    Patient calm and cooperative, appearance fair, thought organized, alert/oriented, activities and self care done, reports SI - would step in front of train - contracted for safety, reports no HI or AVH.       Tuan Perez RN

## 2020-08-21 NOTE — PROGRESS NOTES
SW met with [facility] team to discuss Pts TX and DC plans.      Progress/Behavior/Group Attendance: TBD     Target Symptoms/Reason for admission:  Patient admitted to [facility] due to Presents with c/o Rob Scripture major depression without psychotic features ([address]  Suicidal ideation, Tobacco use disorder, History of alcohol use disorder   UDS: Neg  BAL:  Neg        After[address]Plan: [address]     Pt lives with: Homeless     Collateral obtained from: mom  On:8/2020     Family Session: TBA     Misc:

## 2020-08-21 NOTE — H&P
HISTORY and PHYSICAL      CHIEF COMPLAINT:  Depression, SI    Reason for Admission:  Depression, SI    History Obtained From:  patient    HISTORY OF PRESENT ILLNESS:      The patient is a 30 y.o. male who is admitted to the [place] [address] unit with worsening mood issues. He has no c/o CP or SOA. No abdominal pain or N/V. No dysuria. No weakness or HA. No new pain issues. No fevers. Past Medical History:    History reviewed. No pertinent past medical history. Past Surgical History:    History reviewed. No pertinent surgical history. Medications Prior to Admission:    Medications Prior to Admission: clonazePAM (KLONOPIN) 0.5 MG tablet, Take 0.5 mg by mouth daily as needed for Anxiety. lamoTRIgine (LAMICTAL) 25 MG tablet, Take 25 mg by mouth 2 times daily  ARIPiprazole (ABILIFY) 5 MG tablet, Take 5 mg by mouth daily  ARIPiprazole (ABILIFY) 10 MG tablet, Take 5 mg by mouth daily   traZODone (DESYREL) 100 MG tablet, Take 100 mg by mouth nightly  venlafaxine (EFFEXOR) 50 MG tablet, Take 50 mg by mouth 3 times daily  albuterol sulfate  (90 Base) MCG/ACT inhaler, Inhale 2 puffs into the lungs every 6 hours as needed for Wheezing    Allergies:  Patient has no known allergies. Social History:   TOBACCO:   reports that he has been smoking. He has never used smokeless tobacco.  ETOH:   reports no history of alcohol use. DRUGS:   reports current drug use. Drug: Marijuana. MARITAL STATUS:  single  OCCUPATION:  Not working  Patient currently is homeless      Family History:   History reviewed. No pertinent family history.   REVIEW OF SYSTEMS:  Constitutional: neg  CV: neg  Pulmonary: neg  GI: neg  : neg  Psych: depression, SI  Neuro: neg  Skin: neg  MusculoSkeletal: neg  HEENT: neg  Joints: neg    Vitals:  /61   Pulse 99   Temp 96.1 °F (35.6 °C) (Temporal)   Resp 18   Ht 5' 5\" (1.651 m)   Wt 280 lb (127 kg)   SpO2 97%   BMI 46.59 kg/m²

## 2020-08-21 NOTE — PROGRESS NOTES
[facility] Adult Unit Daily Assessment  Nursing Progress Note     Room: [facility]-   Name: Kailey Rosenbaum   Age: 30 y.o. Gender: male   Dx: <principal problem not specified>  Precautions: suicide risk  Inpatient Status: voluntary         SLEEP:     Sleep Quality Good  Sleep Medications: yes   PRN Sleep Meds: No         MEDICAL:     Other PRN Meds: No   Med Compliant: Yes  Accu-Chek: No  Oxygen/CPAP/BiPAP: No  CIWA/CINA: No   PAIN Assessment: none  Side Effects from medication: No        PSYCH:     Depression: 6  Anxiety: 8  SI positive for passive SI, contracts for safety while in the hospital  HI denies   AVH:denies        GENERAL:     Appetite: decreased    Social: No   Speech: normal   Appearance: appropriately dressed and healthy looking        Notes: Patient calm and cooperative with staff and peers. Patient has been isolated to room and sleeping all night. Patient woke up long enough to take medications and ask for crackers. Will continue to monitor.        Electronically signed by Claudette Linares RN on 8/20/2020 at 10:54 PM

## 2020-08-21 NOTE — PLAN OF CARE
Group Therapy Note     Date: 8/21/2020  Start Time: 1100  End Time:  7279  Number of Participants: 9     Type of Group: Psychotherapy     Wellness Binder Information  Module Name:  emotional wellness  Session Number:  5     Patient's Goal:  obstacles to emotional wellness     Notes:  pt was verbally prompted to attend group. Pt refused. Information about obstacles to wellness was provided. Status After Intervention:       Participation Level:      Participation Quality:         Speech:           Thought Process/Content:         Affective Functioning:         Mood:         Level of consciousness:          Response to Learning:         Endings:      Modes of Intervention:         Discipline Responsible: Psychoeducational Specialist        Signature:  Bozena Kay Inpatient Radiology Pre-procedure Note    History of Present Illness:  Brett Vega is a 74 y.o. male with recent large L thalamic and caudate ICH with intraventricular extension who presents for gastrostomy tube placement.    Admission H&P reviewed.  History reviewed. No pertinent past medical history.  History reviewed. No pertinent surgical history.    Review of Systems:   As documented in primary team H&P    Home Meds:   Prior to Admission medications    Not on File     Scheduled Meds:    amLODIPine  10 mg Oral Daily    carvediloL  12.5 mg Oral BID    enoxaparin  40 mg Subcutaneous Daily    hydrALAZINE  75 mg Oral Q6H    hydroCHLOROthiazide  12.5 mg Oral Daily    ipratropium  0.5 mg Nebulization Q8H    levalbuterol  0.63 mg Nebulization Q8H    losartan  100 mg Oral Daily    polyethylene glycol  17 g Oral Daily    senna-docusate 8.6-50 mg  1 tablet Oral BID    silodosin  4 mg Oral Daily     Continuous Infusions:    ceFAZolin 1 g/50ml Dextrose IVPB       PRN Meds:acetaminophen, albuterol-ipratropium, ceFAZolin 1 g/50ml Dextrose IVPB, hydrALAZINE, labetaloL, sodium chloride 0.9%  Anticoagulants/Antiplatelets: no anticoagulation    Allergies: Review of patient's allergies indicates:  No Known Allergies  Sedation Hx: have not been any systemic reactions    Labs:  No results for input(s): INR in the last 168 hours.    Invalid input(s):  PT,  PTT    Recent Labs   Lab 05/21/20  0507   WBC 10.12   HGB 12.6*   HCT 41.7   MCV 94         Recent Labs   Lab 05/21/20  0507   *      K 3.9      CO2 25   BUN 32*   CREATININE 0.8   CALCIUM 9.0   MG 2.4   ALT 22   AST 24   ALBUMIN 3.3*   BILITOT 0.6         Vitals:  Temp: 97.7 °F (36.5 °C) (05/22/20 0715)  Pulse: 86 (05/22/20 0825)  Resp: 16 (05/22/20 0825)  BP: (!) 181/104 (05/22/20 0825)  SpO2: (!) 93 % (05/22/20 0825)     Physical Exam:  ASA: 2  Mallampati: 3  General: no acute distress  Mental Status: alert and oriented to person, place  and time  HEENT: normocephalic, atraumatic  Chest: unlabored breathing  Heart: regular heart rate  Abdomen: nondistended  Extremity: moves all extremities    Plan: g tube  Sedation Plan: Moderate    Jacob Corrigan MD  Diagnostic and Interventional Radiology PGY-III  [facility]  Pager: [phone]

## 2020-08-21 NOTE — PROGRESS NOTES
[address] [place] [address]      Psychiatric Progress Note    Name:  Makayla Ruiz  Date:  8/21/2020  Age:  30 y.o. Sex:  male  Ethnicity:   Primary Care Physician:  No primary care provider on file. Patient Care Team:  No care team member to display  Chief Complaint: \" I am not wanting to live anymore. \"        Historian:patient  Complaint Type: anxiety, depression, fatigue, loss of interest in favorite activities, sleep disturbance and tobacco use  Course of Symptoms: ongoing  Precipitating Factors: history of mental illness, homeless         Subjective  Nursing notes were reviewed and patient had no behavioral issues during the night. PRN medications include Trazodone for insomnia. Today he continues to endorse suicidal ideation. He states, \"I just don't want to be alive anymore. \" Endorses the same plan to \"jump in front of a train. \" Denies HI and psychosis at this time. Is wanting help finding a shelter or sober living home. His mother will not allow him to return home. He was given all the numbers to contact the sober living homes. Patient reports sleep as \"really good with medication. \" Patient has been calm and cooperative with staff and peers. Patient has been compliant with medications. Patient has been attending groups. Patient reports appetite as       \"it's getting better. \" Patient reports no side effects from medications. Previous Psychiatric/Substance Use History      Medical History:  History reviewed. No pertinent past medical history. MURRAY History:   Social History     Substance and Sexual Activity   Alcohol Use Never    Frequency: Never         Social History     Substance and Sexual Activity   Drug Use Yes    Types: Marijuana        Social History     Tobacco Use   Smoking Status Current Every Day Smoker   Smokeless Tobacco Never Used        Family History:     History reviewed. No pertinent family history.       Vital Signs:  Last set of tests and vitals:  Vitals: 08/20/20 1808   BP: 110/61   Pulse: 99   Resp: 18   Temp: 96.1 °F (35.6 °C)   SpO2:           Mental Status:  Level of consciousness:  within normal limits and awake  Appearance:  disheveled, street clothes, in chair, fair grooming and fair hygiene  Behavior/Motor:  no abnormalities noted  Attitude toward examiner:  cooperative, attentive and good eye contact  Speech:  normal rate and normal volume  Mood:  \"I am still having suicidal thoughts. \"  Affect:  mood congruent  Thought processes:  linear and goal directed  Thought content:  Homocidal ideation :denies  Suicidal Ideation:  passive  Delusions:  no evidence of delusions  Perceptual Disturbance:  denies any perceptual disturbance  Cognition:  oriented to person, place, and time  Concentration : good  Memory intact for recent and remote  Fund of knowledge:  average  Abstract thinking:  adequate  Insight: fair  Judgment:  fair     gabapentin  200 mg Oral TID    venlafaxine  75 mg Oral Daily with breakfast    ARIPiprazole  5 mg Oral Daily    nicotine  1 patch Transdermal Daily       Current Medications:  Current Facility-Administered Medications   Medication Dose Route Frequency Provider Last Rate Last Dose    gabapentin (NEURONTIN) capsule 200 mg  200 mg Oral TID Verlie Dadds, APRN        venlafaxine (EFFEXOR XR) extended release capsule 75 mg  75 mg Oral Daily with breakfast Verlie Dadds, APRN        traZODone (DESYREL) tablet 100 mg  100 mg Oral Nightly PRN Verlie Dadds, APRN   100 mg at 08/20/20 2041    albuterol (PROVENTIL) nebulizer solution 2.5 mg  2.5 mg Nebulization Q6H PRN Verlie Dadds, APRN        ARIPiprazole (ABILIFY) tablet 5 mg  5 mg Oral Daily Verlie Dadds, APRN        acetaminophen (TYLENOL) tablet 650 mg  650 mg Oral Q4H PRN Jasmin Todd MD        polyethylene glycol (GLYCOLAX) packet 17 g  17 g Oral Daily PRN Jasmin Todd MD        nicotine (NICODERM CQ) 21 MG/24HR 1 patch  1 patch Transdermal Daily Dione Astudillo MD   1 patch at 08/20/20 0915    hydrOXYzine (ATARAX) tablet 25 mg  25 mg Oral TID PRN Dione Astudillo MD   25 mg at 08/20/20 1021       Psychotherapy:   SUPPORTIVE    DSM V Diagnoses:    Principal Problem:    Severe major depression without psychotic features ([facility]  Active Problems:    History of alcohol use disorder    Tobacco use disorder    Suicidal ideation    Asthma    Major depressive disorder, recurrent severe without psychotic features ([facility]    Depression  Resolved Problems:    * No resolved hospital problems. *            Plan:    Encourage group therapy  15 minute safety checks  Medical monitoring by Dr. Jamie Fabry and associates  Continue current therapy and medications  Social work to assist with shelter     Amount of time spent with patient:  15 minutes with greater than 50% of the time spent in counseling and collaboration of care.     MIRTHA Zapata  Clinician Signature: signed electronically

## 2020-08-21 NOTE — PROGRESS NOTES
Juliocesar gave patient a list of homeless shelters in [address] [place] [address] for him to call for phone screening

## 2020-08-22 LAB — HBA1C MFR BLD: 5.6 % (ref 4–6)

## 2020-08-22 PROCEDURE — 83036 HEMOGLOBIN GLYCOSYLATED A1C: CPT

## 2020-08-22 PROCEDURE — 6370000000 HC RX 637 (ALT 250 FOR IP): Performed by: NURSE PRACTITIONER

## 2020-08-22 PROCEDURE — 36415 COLL VENOUS BLD VENIPUNCTURE: CPT

## 2020-08-22 PROCEDURE — 94640 AIRWAY INHALATION TREATMENT: CPT

## 2020-08-22 PROCEDURE — 6360000002 HC RX W HCPCS: Performed by: NURSE PRACTITIONER

## 2020-08-22 PROCEDURE — 1240000000 HC EMOTIONAL WELLNESS R&B

## 2020-08-22 PROCEDURE — 6370000000 HC RX 637 (ALT 250 FOR IP): Performed by: PSYCHIATRY & NEUROLOGY

## 2020-08-22 RX ORDER — FLUTICASONE PROPIONATE 50 MCG
2 SPRAY, SUSPENSION (ML) NASAL DAILY
Status: DISCONTINUED | OUTPATIENT
Start: 2020-08-22 | End: 2020-08-24 | Stop reason: HOSPADM

## 2020-08-22 RX ADMIN — GABAPENTIN 200 MG: 100 CAPSULE ORAL at 08:18

## 2020-08-22 RX ADMIN — ACETAMINOPHEN 650 MG: 325 TABLET, FILM COATED ORAL at 15:36

## 2020-08-22 RX ADMIN — ALBUTEROL SULFATE 2.5 MG: 2.5 SOLUTION RESPIRATORY (INHALATION) at 15:35

## 2020-08-22 RX ADMIN — GABAPENTIN 200 MG: 100 CAPSULE ORAL at 14:28

## 2020-08-22 RX ADMIN — GABAPENTIN 200 MG: 100 CAPSULE ORAL at 20:36

## 2020-08-22 RX ADMIN — ARIPIPRAZOLE 5 MG: 5 TABLET ORAL at 08:19

## 2020-08-22 RX ADMIN — TRAZODONE HYDROCHLORIDE 100 MG: 100 TABLET ORAL at 20:36

## 2020-08-22 RX ADMIN — HYDROXYZINE HYDROCHLORIDE 25 MG: 25 TABLET, FILM COATED ORAL at 20:36

## 2020-08-22 RX ADMIN — HYDROXYZINE HYDROCHLORIDE 25 MG: 25 TABLET, FILM COATED ORAL at 14:28

## 2020-08-22 RX ADMIN — VENLAFAXINE HYDROCHLORIDE 75 MG: 75 CAPSULE, EXTENDED RELEASE ORAL at 08:18

## 2020-08-22 ASSESSMENT — PAIN SCALES - GENERAL: PAINLEVEL_OUTOF10: 4

## 2020-08-22 NOTE — PROGRESS NOTES
[facility] Daily Shift Assessment  Nursing Progress Note    Room: 0611/611-01 Name: Dawna Joya Age: 30 y.o. Gender: male   Dx: Severe major depression without psychotic features ([address]  Precautions: suicide risk  Target Symptoms:   Accu-Chek: NoSleep: Yes,Sleep Quality Good SI Yes AVH denies [facility]  ADLs: Yes Speech: normal Depression: 6 Anxiety: 7   Participation LevelActive Listener and Interactive  Appetite: Good  Visitation: No   Participation QualityAppropriate and Attentive    Notes: talking on phone to family at this time. Wearing civilian attire. Appearance and attire is clean and appropriate. Well groomed. Affect is congruent. Social at times. Thought processes are linear and goal directed. Appetite is good. Reports good sleep. attends groups. Has been napping on and off today. Passive SI. NO PLAN CONTRACTS FOR SAFETY. Has been pleasant, calm and cooperative. Good eye contact and concentration.     Signature: Electronically signed by Georges Valadez RN on 8/22/20 at 4:14 PM CDT

## 2020-08-22 NOTE — PROGRESS NOTES
Progress Note  Gui Murry  8/22/2020 10:32 AM  Subjective:   Admit Date:   8/19/2020      CC/ADMIT DX:       Interval History:   Reviewed overnight events and nursing notes. No new physical complaints. I have reviewed all labs/diagnostics from the last 24hrs. ROS:   I have done a 10 point ROS and all are negative, except what is mentioned in the HPI. DIET GENERAL;    Medications:      gabapentin  200 mg Oral TID    venlafaxine  75 mg Oral Daily with breakfast    vitamin D  50,000 Units Oral Weekly    ARIPiprazole  5 mg Oral Daily    nicotine  1 patch Transdermal Daily           Objective:   Vitals: BP (!) 137/100   Pulse 99   Temp 94.9 °F (34.9 °C) (Temporal)   Resp 14   Ht 5' 5\" (1.651 m)   Wt 280 lb (127 kg)   SpO2 97%   BMI 46.59 kg/m²  No intake or output data in the 24 hours ending 08/22/20 1032  General appearance: alert and cooperative with exam  Extremities: extremities normal, atraumatic, no cyanosis or edema  Neurologic:  No obvious focal neurologic deficits. Skin: no rashes    Assessment and Plan:   Principal Problem:    Severe major depression without psychotic features ([facility])  Active Problems:    History of alcohol use disorder    Tobacco use disorder    Suicidal ideation    Asthma    Major depressive disorder, recurrent severe without psychotic features ([facility]    Depression  Resolved Problems:    * No resolved hospital problems. *      Plan:  1. Continue present medication(s)   2. Follow with Psych  3. He is medically stable. I will monitor for any changes or concerns. Discharge planning:   home     Reviewed treatment plans with the patient and/or family.              Electronically signed by Caden Russell MD on 8/22/2020 at 10:32 AM

## 2020-08-22 NOTE — PROGRESS NOTES
Group Therapy Note    Start Time:800  End Time:  523  Number of Participants: 11    Type of Group: Community Meeting       Patient's Goal:  \"shelters\"      Notes:      Participation Level:  Active Listener       Participation Quality: Appropriate      Thought Process/Content: Logical      Affective Functioning: Congruent      Mood: calm      Level of consciousness:  Alert      Modes of Intervention: Support      Discipline Responsible: Behavioral Health Tech II      Signature:  Barry Gordillo

## 2020-08-22 NOTE — PROGRESS NOTES
[facility] Adult Unit Daily Assessment  Nursing Progress Note    Room: [facility]/611-01   Name: Patricia Cisneros   Age: 30 y.o. Gender: male   Dx: Severe major depression without psychotic features (HCC)  Precautions: suicide risk  Inpatient Status: voluntary       SLEEP:    Sleep Quality Good  Sleep Medications: Trazodone   PRN Sleep Meds: No       MEDICAL:    Other PRN Meds: Atarax   Med Compliant: Yes  Accu-Chek: No  Oxygen/CPAP/BiPAP: No  CIWA/CINA: No   PAIN Assessment: none  Side Effects from medication: No      PSYCH:    Depression: \"some\"   Anxiety: \"increased today\"   SI denies suicidal ideation   HI Negative for homicidal ideation      AVH:Absent      GENERAL:    Appetite: good    Social: No   Speech: normal   Appearance: poor hygiene    GROUP:    Group Participation: No  Participation Quality: None    Notes:   Patient has been isolating in the room, got up to go to talk on the phone. Patient reports that his anxiety has been increased today, though does not point to any stressors that could be causing this. Does report that he has some depression noted. Patient has not attended to ADLs today. No behaviors noted at this time.       Electronically signed by Jean Claude Gomez LPN on 4/00/56 at 8:62 PM CDT

## 2020-08-23 PROCEDURE — 94640 AIRWAY INHALATION TREATMENT: CPT

## 2020-08-23 PROCEDURE — 6370000000 HC RX 637 (ALT 250 FOR IP): Performed by: FAMILY MEDICINE

## 2020-08-23 PROCEDURE — 6360000002 HC RX W HCPCS: Performed by: NURSE PRACTITIONER

## 2020-08-23 PROCEDURE — 99232 SBSQ HOSP IP/OBS MODERATE 35: CPT | Performed by: PSYCHIATRY & NEUROLOGY

## 2020-08-23 PROCEDURE — 6370000000 HC RX 637 (ALT 250 FOR IP): Performed by: PSYCHIATRY & NEUROLOGY

## 2020-08-23 PROCEDURE — 1240000000 HC EMOTIONAL WELLNESS R&B

## 2020-08-23 PROCEDURE — 6370000000 HC RX 637 (ALT 250 FOR IP): Performed by: NURSE PRACTITIONER

## 2020-08-23 RX ORDER — VENLAFAXINE HYDROCHLORIDE 75 MG/1
150 CAPSULE, EXTENDED RELEASE ORAL
Status: DISCONTINUED | OUTPATIENT
Start: 2020-08-24 | End: 2020-08-24 | Stop reason: HOSPADM

## 2020-08-23 RX ADMIN — HYDROXYZINE HYDROCHLORIDE 25 MG: 25 TABLET, FILM COATED ORAL at 21:29

## 2020-08-23 RX ADMIN — GABAPENTIN 200 MG: 100 CAPSULE ORAL at 08:35

## 2020-08-23 RX ADMIN — TRAZODONE HYDROCHLORIDE 100 MG: 100 TABLET ORAL at 21:29

## 2020-08-23 RX ADMIN — HYDROXYZINE HYDROCHLORIDE 25 MG: 25 TABLET, FILM COATED ORAL at 12:01

## 2020-08-23 RX ADMIN — VENLAFAXINE HYDROCHLORIDE 75 MG: 75 CAPSULE, EXTENDED RELEASE ORAL at 08:35

## 2020-08-23 RX ADMIN — GABAPENTIN 200 MG: 100 CAPSULE ORAL at 21:29

## 2020-08-23 RX ADMIN — FLUTICASONE PROPIONATE 2 SPRAY: 50 SPRAY, METERED NASAL at 08:34

## 2020-08-23 RX ADMIN — GABAPENTIN 200 MG: 100 CAPSULE ORAL at 14:09

## 2020-08-23 RX ADMIN — ALBUTEROL SULFATE 2.5 MG: 2.5 SOLUTION RESPIRATORY (INHALATION) at 20:11

## 2020-08-23 RX ADMIN — ARIPIPRAZOLE 5 MG: 5 TABLET ORAL at 08:35

## 2020-08-23 NOTE — PROGRESS NOTES
[facility] Adult Unit Daily Assessment  Nursing Progress Note    Room: [facility]-   Name: Geoffrey Jansen   Age: 30 y.o. Gender: male   Dx: Severe major depression without psychotic features ([address]  Precautions: suicide risk  Inpatient Status: voluntary       SLEEP:    Sleep Quality Good  Sleep Medications: No   PRN Sleep Meds: Yes       MEDICAL:    Other PRN Meds: Yes   Med Compliant: Yes  Accu-Chek: No  Oxygen/CPAP/BiPAP: No  CIWA/CINA: No   PAIN Assessment: none  Side Effects from medication: No      PSYCH:    Depression: 7   Anxiety: 7   SI passive, without plan. HI Negative for homicidal ideation      AVH:Absent      GENERAL:    Appetite: good    Social: No   Speech: normal   Appearance: appropriately dressed, appropriately groomed and healthy looking    GROUP:    Group Participation: No  Participation Quality: None    Notes: Out of room in milieu area, was somewhat social with peers and during interview with myself patient was talkative. Endorses that sleep was \"good\" last night and happy that he got \"four to five hours straight\" of sleep in addition to intermittent sleep. Admits to passive SI, with thoughts about \"not needing to be here (alive). \"  Denies active plan and contracts with myself for safety. Eye contact is good during interview. Facial expression is sad but, patient invested, and active participant in our interview conversation.           Electronically signed by Elisa Harris RN on 8/23/20 at 1:34 AM CDT

## 2020-08-23 NOTE — PROGRESS NOTES
Group Therapy Note    Start Time: 800  End Time:  869  Number of Participants: 12    Type of Group: Community Meeting       Patient's Goal:  \"getting numbers for shelters\"      Notes:      Participation Level:  Active Listener       Participation Quality: Appropriate      Thought Process/Content: Logical      Affective Functioning: Congruent      Mood: calm      Level of consciousness:  Alert      Modes of Intervention: Support      Discipline Responsible: Behavioral Health Tech II      Signature:  Anish Vogel

## 2020-08-23 NOTE — PROGRESS NOTES
Pt expressed to LILI his interest in finding a alf house or sober living home somewhere for him to go after discharge. Pt reported that he was previously living with his mom, and they just don't get along when he is staying there. [place]. LILI will research some facilities in [place] and then discuss options with pt.

## 2020-08-23 NOTE — PROGRESS NOTES
Progress Note  Dionisio Barrow  8/23/2020 4:33 PM  Subjective:   Admit Date:   8/19/2020      CC/ADMIT DX:       Interval History:   Reviewed overnight events and nursing notes. He has no new medical issues. I have reviewed all labs/diagnostics from the last 24hrs. ROS:   I have done a 10 point ROS and all are negative, except what is mentioned in the HPI. DIET GENERAL;    Medications:      [START ON 8/24/2020] venlafaxine  150 mg Oral Daily with breakfast    fluticasone  2 spray Each Nostril Daily    gabapentin  200 mg Oral TID    vitamin D  50,000 Units Oral Weekly    ARIPiprazole  5 mg Oral Daily    nicotine  1 patch Transdermal Daily           Objective:   Vitals: /81   Pulse 97   Temp 96.5 °F (35.8 °C) (Temporal)   Resp 20   Ht 5' 5\" (1.651 m)   Wt 280 lb (127 kg)   SpO2 95%   BMI 46.59 kg/m²  No intake or output data in the 24 hours ending 08/23/20 1633  General appearance: alert and cooperative with exam  Extremities: extremities normal, atraumatic, no cyanosis or edema  Neurologic:  No obvious focal neurologic deficits. Skin: no rashes    Assessment and Plan:   Principal Problem:    Severe major depression without psychotic features ([facility])  Active Problems:    History of alcohol use disorder    Tobacco use disorder    Suicidal ideation    Asthma    Major depressive disorder, recurrent severe without psychotic features ([facility] [address]    Depression  Resolved Problems:    * No resolved hospital problems. *      Plan:  1. Continue present medication(s)   2. Follow with Psych  3. He has no new medical issues. I will monitor for any changes or concerns. Discharge planning:   home     Reviewed treatment plans with the patient and/or family.              Electronically signed by Katelyn Ramirez MD on 8/23/2020 at 4:33 PM

## 2020-08-23 NOTE — PROGRESS NOTES
BHI Daily Shift Assessment  Nursing Progress Note    Room: 0611/611-01 Name: Marissa Smiley Age: 30 y.o. Gender: male   Dx: Severe major depression without psychotic features ([address]  Precautions: suicide risk  Target Symptoms:   Accu-Chek: NoSleep: Yes,Sleep Quality Good SI No AVH denies [facility][1]  ADLs: Yes Speech: normal Depression: 6 Anxiety: 6   Participation LevelActive Listener  Appetite: Good  Visitation: No   Participation QualityAppropriate and Attentive     Notes: alert and oriented x4. Pleasant, calm and cooperative. Wearing civilian clothes, appearance and attire is clean and appropriate. Well groomed. Appetite is good. Medication compliant. Reports good sleep. Thought processes are linear and goal direct. Denies SI, HI, AND AVH.     Signature: Electronically signed by Zhou Singleton RN on 8/23/20 at 1:28 PM CDT

## 2020-08-23 NOTE — PROGRESS NOTES
Intact. Orientation: to person, place, date, and situation. Language: Intact. Fund of information: Intact. Memory: Recent and remote appear intact. Neurovegitative: Improved appetite and sleep. Insight: Limited. Judgment: Limited.     Data  Lab Results   Component Value Date    WBC 7.9 08/19/2020    HGB 15.3 08/19/2020    HCT 44.5 08/19/2020    MCV 89.7 08/19/2020     08/19/2020      Lab Results   Component Value Date     08/19/2020    K 4.7 08/19/2020     08/19/2020    CO2 27 08/19/2020    BUN 16 08/19/2020    CREATININE 1.0 08/19/2020    GLUCOSE 93 08/19/2020    CALCIUM 10.0 08/19/2020    PROT 7.3 08/19/2020    LABALBU 4.7 08/19/2020    BILITOT <0.2 08/19/2020    ALKPHOS 56 08/19/2020    AST 40 08/19/2020    ALT 80 (H) 08/19/2020    LABGLOM >60 08/19/2020    GFRAA >59 08/19/2020       Medications    Current Facility-Administered Medications:     fluticasone (FLONASE) 50 MCG/ACT nasal spray 2 spray, 2 spray, Each Nostril, Daily, David Abdullahi MD, 2 spray at 08/23/20 8622    gabapentin (NEURONTIN) capsule 200 mg, 200 mg, Oral, TID, Alfred Andrade, APRN, 200 mg at 08/23/20 5504    venlafaxine (EFFEXOR XR) extended release capsule 75 mg, 75 mg, Oral, Daily with breakfast, Alfred Andrade, APRN, 75 mg at 08/23/20 7254    vitamin D (ERGOCALCIFEROL) capsule 50,000 Units, 50,000 Units, Oral, Weekly, David Abdullahi MD, 50,000 Units at 08/21/20 1323    traZODone (DESYREL) tablet 100 mg, 100 mg, Oral, Nightly PRN, Alfred Andrade, APRN, 100 mg at 08/22/20 2036    albuterol (PROVENTIL) nebulizer solution 2.5 mg, 2.5 mg, Nebulization, Q6H PRN, Alfred Andraed, APRN, 2.5 mg at 08/22/20 1535    ARIPiprazole (ABILIFY) tablet 5 mg, 5 mg, Oral, Daily, MIRTHA Stoner, 5 mg at 08/23/20 0835    acetaminophen (TYLENOL) tablet 650 mg, 650 mg, Oral, Q4H PRN, Donna Beth MD, 650 mg at 08/22/20 1536    polyethylene glycol (GLYCOLAX) packet 17 g, 17 g, Oral, Daily PRN, Deepti Benz MD    nicotine (NICODERM CQ) 21 MG/24HR 1 patch, 1 patch, Transdermal, Daily, Deepti Benz MD, 1 patch at 08/23/20 0834    hydrOXYzine (ATARAX) tablet 25 mg, 25 mg, Oral, TID PRN, Deepti Benz MD, 25 mg at 08/23/20 1201      ASSESSMENT AND PLAN  DSM 5 DIAGNOSIS  Impression  Major depressive disorder, recurrent, severe  Suicidal ideation  Alcohol use disorder  Tobacco use disorder    Patient continues to endorse suicidal ideation and is meeting the criteria for inpatient psychiatric treatment. Plan:   1. Psychiatric Medications:   Increase Effexor to help with depression and anxiety. Monitor for side effects. The risks, benefits, side effects, indications, contraindications, alternatives and adverse effects of the medications have been discussed with patient. 2. Continue to provide supportive psychotherapy. Encourage socialization and participation in recreational activities. Work on coping skills. 3. Medical Issues:    Continue medical monitoring by Dr. Althea Ramos and associates. Vitamin supplementation. Will check lipid panel tomorrow. 4. Disposition:     to provide outpatient resources and facilitate disposition.      Amount of time spent with patient:      25 minutes with greater than 50 % of the time spent in counseling and collaboration of care

## 2020-08-24 VITALS
SYSTOLIC BLOOD PRESSURE: 145 MMHG | WEIGHT: 280 LBS | TEMPERATURE: 95.1 F | OXYGEN SATURATION: 96 % | HEART RATE: 111 BPM | RESPIRATION RATE: 20 BRPM | BODY MASS INDEX: 46.65 KG/M2 | DIASTOLIC BLOOD PRESSURE: 96 MMHG | HEIGHT: 65 IN

## 2020-08-24 PROBLEM — F14.10 COCAINE USE DISORDER (HCC): Status: ACTIVE | Noted: 2020-08-24

## 2020-08-24 LAB
CHOLESTEROL, TOTAL: 244 MG/DL (ref 160–199)
HDLC SERPL-MCNC: 44 MG/DL (ref 55–121)
LDL CHOLESTEROL CALCULATED: 135 MG/DL
TRIGL SERPL-MCNC: 325 MG/DL (ref 0–149)

## 2020-08-24 PROCEDURE — 6370000000 HC RX 637 (ALT 250 FOR IP): Performed by: PSYCHIATRY & NEUROLOGY

## 2020-08-24 PROCEDURE — 80061 LIPID PANEL: CPT

## 2020-08-24 PROCEDURE — 99238 HOSP IP/OBS DSCHRG MGMT 30/<: CPT | Performed by: NURSE PRACTITIONER

## 2020-08-24 PROCEDURE — 36415 COLL VENOUS BLD VENIPUNCTURE: CPT

## 2020-08-24 PROCEDURE — 5130000000 HC BRIDGE APPOINTMENT

## 2020-08-24 PROCEDURE — 6370000000 HC RX 637 (ALT 250 FOR IP): Performed by: NURSE PRACTITIONER

## 2020-08-24 RX ORDER — ERGOCALCIFEROL 1.25 MG/1
50000 CAPSULE ORAL WEEKLY
Qty: 5 CAPSULE | Refills: 0 | Status: SHIPPED | OUTPATIENT
Start: 2020-08-28

## 2020-08-24 RX ORDER — HYDROXYZINE HYDROCHLORIDE 25 MG/1
25 TABLET, FILM COATED ORAL 3 TIMES DAILY PRN
Qty: 30 TABLET | Refills: 0 | Status: SHIPPED | OUTPATIENT
Start: 2020-08-24 | End: 2020-09-03

## 2020-08-24 RX ADMIN — ARIPIPRAZOLE 5 MG: 5 TABLET ORAL at 09:39

## 2020-08-24 RX ADMIN — VENLAFAXINE HYDROCHLORIDE 150 MG: 75 CAPSULE, EXTENDED RELEASE ORAL at 09:39

## 2020-08-24 RX ADMIN — GABAPENTIN 200 MG: 100 CAPSULE ORAL at 09:39

## 2020-08-24 NOTE — PROGRESS NOTES
Discharge Note     Pt discharging on this date. Pt denies SI, HI, and AVH at this time. Pt reports improvement in behavior and is leaving unit in overall good condition. SW and pt discussed pt's follow up appointments and importance of attending appointments as scheduled, pt voiced understanding and agreement. Pt and SW also discussed pt safety plan and pt able to verbally identify: warning signs, coping strategies, places and people that help make the pt feel better/distract negative thoughts, friends/family/agencies/professionals the pt can reach out to in a crisis, and something that is important to the pt/worth living for. Pt provided the national suicide prevention hotline number (7-706-082-062-895-9899) as well as local community behavioral health [facility]) crisis number for emergencies (1-942.905.7745). Pt to follow up with:  [facility] in [address] for counseling and therapy  Referral to out patient tobacco cessation counseling treatment:    Patient refused tobacco cessation counseling    SW offered to assist pt with transportation, pt reports that he will need assistance. SW arranged for [facility] to transport patient to [facility].

## 2020-08-24 NOTE — PROGRESS NOTES
Treatment Team Note:     SW met with [address] team to discuss Pts [place] and DC plans.      Progress/Behavior/Group Attendance: TBD     Target Symptoms/Reason for admission:  Patient admitted to [facility] due to Presents with c/o Han Isaiah major depression without psychotic features ([address]  Suicidal ideation, Tobacco use disorder, History of alcohol use disorder   UDS: Neg  BAL:  Neg        AftercarePlan: [address]     Pt lives with: Homeless     Collateral obtained from: mom  On:8/2020     Family Session: LES     Misc:

## 2020-08-24 NOTE — PROGRESS NOTES
Medications:   Medication Summary Provided. I understand that I should take only the medications on my list.   --why and when I need to take each medication. --which side effects to watch for.   --that I should carry my medication information at all times in case of emergency    Situations. --I will take all medications until discontinued by physician. I will take all my medications to follow up appointments. --check with my physician or pharmacist before taking any new medication, over    the counter product or drink alcohol.   --ask about food, drug and dietary supplement interactions. --discard old lists and update records with medication providers. Behavior Health Follow Up:  Original Referral Source: ED  Discharge Diagnosis:   Patient Active Problem List   Diagnosis    Severe major depression without psychotic features ([facility]    History of alcohol use disorder    Tobacco use disorder    Suicidal ideation    Asthma    Major depressive disorder, recurrent severe without psychotic features ([facility]    Depression     Recomendations for Level of Care: Follow Up  Patient Status at Discharge: Stable  [facility]  was: [facility]   Aftercare plan faxed:    Faxed by: Social Work staff   Date: 20   Time:   Prescriptions sent with pt.  E-SCRIPT SENT TO PHARMACY    General Information:   Questions regarding your bill: Call [phone]   Suicide Hotline (Rescue Crisis) 0-560.894.4284   To obtain results of pending studies call Medical Records at: [phone]   For emergencies and 24 hour/7 days a week contact information: [phone]

## 2020-08-24 NOTE — PROGRESS NOTES
[facility] Adult Unit Daily Assessment  Nursing Progress Note    Room: [facility]/611-01   Name: Mari Hawkins   Age: 30 y.o. Gender: male   Dx: Severe major depression without psychotic features ([address]  Precautions: suicide risk  Inpatient Status: voluntary       SLEEP:    Sleep Quality Good  Sleep Medications: No   PRN Sleep Meds: Yes       MEDICAL:    Other PRN Meds: Yes   Med Compliant: Yes  Accu-Chek: No  Oxygen/CPAP/BiPAP: No  CIWA/CINA: No   PAIN Assessment: none  Side Effects from medication: No      PSYCH:    Depression: 4   Anxiety: \"three or four\"   SI denies suicidal ideation   HI Negative for homicidal ideation      AVH:Absent      GENERAL:    Appetite: good    Social: Yes   Speech: normal   Appearance: appropriately dressed, appropriately groomed and healthy looking    GROUP:    Group Participation: Yes  Participation Quality: Interactive    Notes: Brightened, endorses feeling improved. Shares \"I've been out of my room tonight longer than the whole time I have been here. \"  Social with peers and staff this PM.  Noted to smile and laugh at times. During interview shared that he felt better d/t post discharge options he was made aware of by Social Service team member today. Endorses he had a good day and \"so did my Mom, and that is what I want for my family to be happy. \"  Shares with me he may stay in [place] or use a bus voucher to go to [place], once discharged. Admits to improved sleep and is happy that is getting more \"straight hours\" of sleep verses awakening frequently.           Electronically signed by Saúl Jurado RN on 8/23/20 at 11:18 PM CDT

## 2020-08-24 NOTE — DISCHARGE SUMMARY
Discharge Summary     Patient ID:  Agustín Espitia  [id number]  63 y.o.  1990    Admit date: 8/19/2020  Discharge date: 8/24/2020    Admitting Physician: Brea Valdovinos MD   Attending Physician: No att. providers found  Discharge Provider: Ananya Rosas     Discharge Diagnoses: Severe major depression without psychotic features ([facility] [address] [place], Cocaine use disorder [facility])    Admission Condition: fair    Discharged Condition: good    Indication for Admission: depression and suicidal ideation    HPI:  Patient is a 81-year-old  male who presents with depression and suicidal ideation with a plan to step out in front of a train. He recently left an inpatient psychiatric hospital in [facility] yesterday. He said at that facility for 1 week. He reports that he was trying to get to the homeless shelter ([address]) here in [facility]. Reports when he arrived they told him his urine drug screen was positive for marijuana and he was not able to stay there. He states, \"after they told me that I thought I am done with life I have been in the bottom for so long. \"  He reports he then started having suicidal thoughts to step in front of a train. Urine drug screen negative, blood alcohol level negative. Endorses a history of alcohol use disorder. Reports he has not drank heavily in a year. Medical history includes asthma. Today he continues to endorse suicidal ideation however does contract for safety. He denies homicidal ideation and psychosis. Reports he would like to move back in with his mother however he is not sure if that is an option. Reports sleep as \"very poor\", appetite has been fine, energy and concentration have been poor. He rates depression as a 7 on a scale of 0-10 and anxiety as an 8. Becomes tearful during the evaluation and states, \"I just want to be stable again. \"  Reports a prior suicide attempt 5 years ago by hanging.   His mother was contacted and she reports he is not allowed to live with her. She reports he has been bouncing around to different friends houses, psychiatric facilities and shelters. She also reports he has a history of abusing prescription medication. He was educated on sober living homes and surrounding shelters.        Hospital Course:   Patient was admitted to the adult behavioral health floor and was acclimated to the floor. Labs were reviewed and physical exam was completed by Dr. Octavio Nguyen and associates. Home medications were reconciled. Collateral was obtained from his mother how will not allow him back in her home. Mother reported that Jessica Carpenter had stolen \"everything she has\" a few weeks ago and now she cannot pay her bills and she is getting chemo treatments. Mother reports he has has been \"bouncing around to friends houses and psych hospitals for a place to live. \" Mother reported that he needs to be in rehab for drug abuse problems (cocaine and prescription pills). BALWINDER was obtained and reviewed. Medication changes were made and patient tolerated well with no side effects. He was med seeking for benzodiazapine's when he arrived to the unit. He was placed on a Gabapentin taper and tolerated well. He was educated several times on the importance of going to chemical dependency and he declined. He was also given information on sober living homes. He was given the numbers to the [address] [place] [address] for the broken and he never completed the over the phone assessment. He expected the staff to complete that for him. He was told that they have to hear from the patient. He then never took an initiative to call them back. He then wanted information on homeless shelters. He was not allowed back at [facility] related to a positive drug screen for marijuana. He then reported that he could not return to the shelter in [place], [place] because he has warrants in that town.  He reportedly told his mother over the phone that if he cannot find a place and thc    Prescription for Tobacco Cessation medication: n/a    If no prescriptions for Tobacco Cessation must document why: n/a    Consults: internal medicine    Significant Diagnostic Studies: labs:     Lab Results   Component Value Date    WBC 7.9 08/19/2020    HGB 15.3 08/19/2020    HCT 44.5 08/19/2020    MCV 89.7 08/19/2020     08/19/2020     Lab Results   Component Value Date     08/19/2020    K 4.7 08/19/2020     08/19/2020    CO2 27 08/19/2020    BUN 16 08/19/2020    CREATININE 1.0 08/19/2020    GLUCOSE 93 08/19/2020    CALCIUM 10.0 08/19/2020      Lab Results   Component Value Date    TSHFT4 3.55 08/21/2020     Lab Results   Component Value Date    VITD25 14.0 (L) 08/21/2020     Results for Ja Lira (MRN [id number]) as of 8/24/2020 16:24   Ref. Range 8/24/2020 05:48   Cholesterol, Total Latest Ref Range: 160 - 199 mg/dL 244 (H)   HDL Cholesterol Latest Ref Range: 55 - 121 mg/dL 44 (L)   LDL Calculated Latest Ref Range: <100 mg/dL 135   Triglycerides Latest Ref Range: 0 - 149 mg/dL 325 (H)     Results for Ja Lira (MRN [id number]) as of 8/24/2020 16:24   Ref. Range 8/19/2020 14:55   Albumin Latest Ref Range: 3.5 - 5.2 g/dL 4.7   Alk Phos Latest Ref Range: 40 - 130 U/L 56   ALT Latest Ref Range: 5 - 41 U/L 80 (H)   AST Latest Ref Range: 5 - 40 U/L 40   Bilirubin Latest Ref Range: 0.2 - 1.2 mg/dL <0.2   Total Protein Latest Ref Range: 6.6 - 8.7 g/dL 7.3     Results for Ja Lira (MRN [id number]) as of 8/24/2020 16:24   Ref.  Range 8/19/2020 14:55   Amphetamine Screen, Urine Latest Ref Range: Negative <1000 ng/mL  Negative   Barbiturate Screen, Ur Latest Ref Range: Negative < 200 ng/mL  Negative   Benzodiazepine Screen, Urine Latest Ref Range: Negative <100 ng/mL  Negative   Cannabinoid Scrn, Ur Latest Ref Range: Negative <50 ng/mL  Negative   Opiate Scrn, Ur Latest Ref Range: Negative < 300 ng/mL  Negative   Cocaine Metabolite Screen, Urine Latest Ref Range: Negative <300 ng/mL Negative       Results for Eboni Watson (MRN [id number]) as of 8/24/2020 16:24   Ref. Range 8/22/2020 05:14   Hemoglobin A1C Latest Ref Range: 4.0 - 6.0 % 5.6     Results for Eboni Watson (MRN [id number]) as of 8/24/2020 16:24   Ref. Range 8/21/2020 05:23   Vitamin B-12 Latest Ref Range: 211 - 946 pg/mL 479       Treatments: therapies: RN and SW    Alert, Oriented X 4  Appearance:  Grooming and Hygiene attended to  Speech with Regular Rate and Rhythm  Eye Contact:  Good  No Psychomotor Agitation/Retardation Noted  Attitude:  Cooperative  Mood:  \"I am feeling better now that I have a place to live. \"  Affective: Congruent, appropriate to the situation, with a normal range and intensity  Thought Processes:  Coherently communicated, logical and goal oriented  Thought Content:  At this time  No Suicidal Ideation, No Homicidal Ideation, No Auditory or Visual  Hallucinations, No overt Delusions  Insight:  Present  Judgement:  Normal  Memory is intact for both remote and recent  Intellectual Functioning:  Within the [address] of Knowledge:  Adequate  Attention and Concentration:  Adequate        Discharge Exam:  GAIT STABLE   SPEAKS IN FULL SENTENCES WITHOUT SHORTNESS OF AIR    Disposition: [address]    Patient Instructions:   Discharge Medication List as of 8/24/2020  1:41 PM      START taking these medications    Details   hydrOXYzine (ATARAX) 25 MG tablet Take 1 tablet by mouth 3 times daily as needed for Itching, Disp-30 tablet,R-0Normal      vitamin D (ERGOCALCIFEROL) 1.25 MG (59500 UT) CAPS capsule Take 1 capsule by mouth once a week, Disp-5 capsule,R-0Normal         CONTINUE these medications which have NOT CHANGED    Details   lamoTRIgine (LAMICTAL) 25 MG tablet Take 25 mg by mouth 2 times dailyHistorical Med      ARIPiprazole (ABILIFY) 10 MG tablet Take 5 mg by mouth daily Historical Med      traZODone (DESYREL) 100 MG tablet Take 100 mg by mouth nightlyHistorical Med      venlafaxine (EFFEXOR) 50 MG tablet Take 50 mg by mouth 3 times dailyHistorical Med      albuterol sulfate  (90 Base) MCG/ACT inhaler Inhale 2 puffs into the lungs every 6 hours as needed for WheezingHistorical Med         STOP taking these medications       clonazePAM (KLONOPIN) 0.5 MG tablet Comments:   Reason for Stopping:             Activity: activity as tolerated  Diet: regular diet  Wound Care: none needed    Follow-up with   PCP in 2 weeks.     Maurice in one week     Time worked: Less than 30 minutes    Participation:good    Electronically signed by MIRTHA Ray on 8/24/2020 at 4:21 PM

## 2020-08-24 NOTE — PROGRESS NOTES
[facility] Daily Shift Assessment  Nursing Progress Note    Room: [facility]611-01 Name: Sen Topete Age: 30 y.o. Gender: male   Dx: Severe major depression without psychotic features ([address]  Precautions: suicide risk  Target Symptoms:   Accu-Chek: NoSleep: Yes,Sleep Quality Good SI No AVH denies [facility]  ADLs: No Speech: normal Depression: 6 Anxiety: 6   Participation LevelActive Listener  Appetite: Good  Visitation: No COVID 19  Participation QualityAppropriate    Complaints:    Notes:     Signature:  Nithin Baptiste RN